# Patient Record
Sex: MALE | Race: WHITE | NOT HISPANIC OR LATINO | Employment: OTHER | URBAN - METROPOLITAN AREA
[De-identification: names, ages, dates, MRNs, and addresses within clinical notes are randomized per-mention and may not be internally consistent; named-entity substitution may affect disease eponyms.]

---

## 2018-05-18 ENCOUNTER — ESTABLISHED COMPREHENSIVE EXAM (OUTPATIENT)
Dept: URBAN - METROPOLITAN AREA CLINIC 36 | Facility: CLINIC | Age: 73
End: 2018-05-18

## 2018-05-18 DIAGNOSIS — H25.813: ICD-10-CM

## 2018-05-18 DIAGNOSIS — H04.123: ICD-10-CM

## 2018-05-18 DIAGNOSIS — H40.013: ICD-10-CM

## 2018-05-18 PROCEDURE — 92133 CPTRZD OPH DX IMG PST SGM ON: CPT

## 2018-05-18 PROCEDURE — 1036F TOBACCO NON-USER: CPT

## 2018-05-18 PROCEDURE — G8427 DOCREV CUR MEDS BY ELIG CLIN: HCPCS

## 2018-05-18 PROCEDURE — G8756 NO BP MEASURE DOC: HCPCS

## 2018-05-18 PROCEDURE — 92014 COMPRE OPH EXAM EST PT 1/>: CPT

## 2018-05-18 ASSESSMENT — VISUAL ACUITY
OD_SC: 20/25
OS_CC: J1
OS_SC: 20/40+2
OS_SC: J8
OD_CC: J2
OD_SC: J10
OS_PH: 20/20

## 2018-05-18 ASSESSMENT — TONOMETRY
OS_IOP_MMHG: 12
OD_IOP_MMHG: 11

## 2018-10-02 NOTE — PATIENT DISCUSSION
pt says vascular tortuosity has been noted up [facility] in past and sounds like she had an FA in the past (maybe 5-10 years ago).   This FA did not reveal any issues.

## 2018-11-16 ENCOUNTER — IOP CHECK (OUTPATIENT)
Dept: URBAN - METROPOLITAN AREA CLINIC 36 | Facility: CLINIC | Age: 73
End: 2018-11-16

## 2018-11-16 DIAGNOSIS — H40.013: ICD-10-CM

## 2018-11-16 PROCEDURE — 92083 EXTENDED VISUAL FIELD XM: CPT

## 2018-11-16 PROCEDURE — 92012 INTRM OPH EXAM EST PATIENT: CPT

## 2018-11-16 PROCEDURE — 1036F TOBACCO NON-USER: CPT

## 2018-11-16 PROCEDURE — G8756 NO BP MEASURE DOC: HCPCS

## 2018-11-16 PROCEDURE — G9903 PT SCRN TBCO ID AS NON USER: HCPCS

## 2018-11-16 PROCEDURE — G8428 CUR MEDS NOT DOCUMENT: HCPCS

## 2018-11-16 ASSESSMENT — VISUAL ACUITY
OD_SC: 20/25-1
OS_SC: 20/100
OS_PH: 20/30

## 2018-11-16 ASSESSMENT — TONOMETRY
OS_IOP_MMHG: 13
OD_IOP_MMHG: 12

## 2019-05-17 ENCOUNTER — ESTABLISHED COMPREHENSIVE EXAM (OUTPATIENT)
Dept: URBAN - METROPOLITAN AREA CLINIC 36 | Facility: CLINIC | Age: 74
End: 2019-05-17

## 2019-05-17 DIAGNOSIS — H40.013: ICD-10-CM

## 2019-05-17 DIAGNOSIS — H25.813: ICD-10-CM

## 2019-05-17 DIAGNOSIS — H04.123: ICD-10-CM

## 2019-05-17 DIAGNOSIS — I10: ICD-10-CM

## 2019-05-17 PROCEDURE — 92226 OPHTHALMOSCOPY (SUB): CPT

## 2019-05-17 PROCEDURE — 92014 COMPRE OPH EXAM EST PT 1/>: CPT

## 2019-05-17 PROCEDURE — 92250 FUNDUS PHOTOGRAPHY W/I&R: CPT

## 2019-05-17 ASSESSMENT — TONOMETRY
OD_IOP_MMHG: 12
OS_IOP_MMHG: 12

## 2019-05-17 ASSESSMENT — VISUAL ACUITY
OS_CC: J1
OD_SC: 20/25+2
OD_CC: J2
OS_BAT: 20/80
OS_SC: 20/70-1
OS_SC: J2
OS_PH: 20/25-1
OD_BAT: 20/60
OD_SC: J5

## 2019-07-24 ENCOUNTER — EST. PATIENT EMERGENCY (OUTPATIENT)
Dept: URBAN - METROPOLITAN AREA CLINIC 36 | Facility: CLINIC | Age: 74
End: 2019-07-24

## 2019-07-24 DIAGNOSIS — H35.371: ICD-10-CM

## 2019-07-24 DIAGNOSIS — H43.811: ICD-10-CM

## 2019-07-24 PROCEDURE — 92012 INTRM OPH EXAM EST PATIENT: CPT

## 2019-07-24 ASSESSMENT — VISUAL ACUITY
OS_PH: 20/30+2
OD_SC: 20/25
OS_SC: 20/70-1

## 2019-07-24 ASSESSMENT — TONOMETRY
OS_IOP_MMHG: 13
OD_IOP_MMHG: 13

## 2019-10-15 NOTE — PATIENT DISCUSSION
pt says vascular tortuosity has been noted up [address] in past and sounds like she had an FA in the past (maybe 5-10 years ago).   This FA did not reveal any issues.

## 2019-10-22 ENCOUNTER — EST. PATIENT EMERGENCY (OUTPATIENT)
Dept: URBAN - METROPOLITAN AREA CLINIC 36 | Facility: CLINIC | Age: 74
End: 2019-10-22

## 2019-10-22 DIAGNOSIS — H25.811: ICD-10-CM

## 2019-10-22 DIAGNOSIS — H04.123: ICD-10-CM

## 2019-10-22 DIAGNOSIS — H25.812: ICD-10-CM

## 2019-10-22 PROCEDURE — 92012 INTRM OPH EXAM EST PATIENT: CPT

## 2019-10-22 ASSESSMENT — VISUAL ACUITY
OD_SC: 20/25-2
OS_PH: 20/50
OS_SC: 20/70

## 2019-10-22 ASSESSMENT — TONOMETRY
OD_IOP_MMHG: 12
OS_IOP_MMHG: 13

## 2019-10-25 ENCOUNTER — CATARACT EVALUATION (OUTPATIENT)
Dept: URBAN - METROPOLITAN AREA CLINIC 36 | Facility: CLINIC | Age: 74
End: 2019-10-25

## 2019-10-25 DIAGNOSIS — H43.811: ICD-10-CM

## 2019-10-25 DIAGNOSIS — H35.371: ICD-10-CM

## 2019-10-25 DIAGNOSIS — H04.123: ICD-10-CM

## 2019-10-25 DIAGNOSIS — H40.013: ICD-10-CM

## 2019-10-25 DIAGNOSIS — H25.813: ICD-10-CM

## 2019-10-25 PROCEDURE — 92014 COMPRE OPH EXAM EST PT 1/>: CPT

## 2019-10-25 PROCEDURE — 92136TC INTERFEROMETRY - TECHNICAL COMPONENT

## 2019-10-25 PROCEDURE — 92134 CPTRZ OPH DX IMG PST SGM RTA: CPT

## 2019-10-25 ASSESSMENT — VISUAL ACUITY
OD_CC: J3
OS_PH: 20/25-1
OS_CC: J1
OD_SC: J8
OS_BAT: 20/80
OD_BAT: 20/60
OS_SC: J3
OD_SC: 20/20-1
OS_SC: 20/70-1

## 2019-10-25 ASSESSMENT — TONOMETRY
OS_IOP_MMHG: 12
OD_IOP_MMHG: 13

## 2020-05-22 ENCOUNTER — ESTABLISHED COMPREHENSIVE EXAM (OUTPATIENT)
Dept: URBAN - METROPOLITAN AREA CLINIC 36 | Facility: CLINIC | Age: 75
End: 2020-05-22

## 2020-05-22 DIAGNOSIS — H40.013: ICD-10-CM

## 2020-05-22 PROCEDURE — 92014 COMPRE OPH EXAM EST PT 1/>: CPT

## 2020-05-22 PROCEDURE — 92133 CPTRZD OPH DX IMG PST SGM ON: CPT

## 2020-05-22 ASSESSMENT — VISUAL ACUITY
OS_PH: 20/30
OS_CC: J1
OS_SC: 20/70-2
OS_SC: J2
OD_CC: J2
OD_SC: 20/25-2
OD_SC: J10
OD_BAT: 20/60
OS_BAT: 20/80

## 2020-05-22 ASSESSMENT — TONOMETRY
OD_IOP_MMHG: 12
OS_IOP_MMHG: 13

## 2020-10-22 NOTE — PATIENT DISCUSSION
pt says vascular tortuosity has been noted up [facility] in past and sounds like she had an FA in the past (maybe 5-10 years ago).   This FA did not reveal any issues.

## 2021-02-10 NOTE — PATIENT DISCUSSION
CATARACTS, OU- NOT VISUALLY SIGNIFICANT. DISC OPT OF GLS/HLAF-JB-EZFRIC-VS-REFRACTIVE SX TO REDUCE DEPENDENCY ON GLS.

## 2021-05-27 ENCOUNTER — ESTABLISHED COMPREHENSIVE EXAM (OUTPATIENT)
Dept: URBAN - METROPOLITAN AREA CLINIC 36 | Facility: CLINIC | Age: 76
End: 2021-05-27

## 2021-05-27 DIAGNOSIS — H04.123: ICD-10-CM

## 2021-05-27 DIAGNOSIS — H43.811: ICD-10-CM

## 2021-05-27 DIAGNOSIS — H35.371: ICD-10-CM

## 2021-05-27 DIAGNOSIS — H40.013: ICD-10-CM

## 2021-05-27 DIAGNOSIS — H25.812: ICD-10-CM

## 2021-05-27 DIAGNOSIS — H25.811: ICD-10-CM

## 2021-05-27 PROCEDURE — 92014 COMPRE OPH EXAM EST PT 1/>: CPT

## 2021-05-27 PROCEDURE — 92015 DETERMINE REFRACTIVE STATE: CPT

## 2021-05-27 PROCEDURE — 92083 EXTENDED VISUAL FIELD XM: CPT

## 2021-05-27 ASSESSMENT — VISUAL ACUITY
OS_PH: 20/40
OD_SC: J10+2
OS_SC: 20/100+1
OD_SC: 20/25
OS_SC: J8

## 2021-05-27 ASSESSMENT — TONOMETRY
OD_IOP_MMHG: 12
OS_IOP_MMHG: 12

## 2021-06-09 ENCOUNTER — REFRACTION ONLY (OUTPATIENT)
Dept: URBAN - METROPOLITAN AREA CLINIC 36 | Facility: CLINIC | Age: 76
End: 2021-06-09

## 2021-06-09 DIAGNOSIS — H52.7: ICD-10-CM

## 2021-06-09 PROCEDURE — 92015GRNC REFRACTION GLASSES RECHECK - NO CHARGE

## 2021-06-09 ASSESSMENT — VISUAL ACUITY
OD_CC: 20/25-1
OS_CC: 20/50

## 2021-10-22 NOTE — PATIENT DISCUSSION
pt says vascular tortuosity has been noted up [facility] in past and sounds like she had an FA in the past (maybe 5-10 years ago).   This FA did not reveal any issues.

## 2022-04-28 ENCOUNTER — EMERGENCY VISIT (OUTPATIENT)
Dept: URBAN - METROPOLITAN AREA CLINIC 36 | Facility: CLINIC | Age: 77
End: 2022-04-28

## 2022-04-28 DIAGNOSIS — H11.32: ICD-10-CM

## 2022-04-28 DIAGNOSIS — H57.12: ICD-10-CM

## 2022-04-28 PROCEDURE — 92012 INTRM OPH EXAM EST PATIENT: CPT

## 2022-04-28 ASSESSMENT — TONOMETRY
OD_IOP_MMHG: 12
OS_IOP_MMHG: 13

## 2022-04-28 ASSESSMENT — VISUAL ACUITY
OS_PH: 20/30-2
OD_SC: 20/30-2
OS_SC: 20/60-1

## 2022-05-26 ENCOUNTER — COMPREHENSIVE EXAM (OUTPATIENT)
Dept: URBAN - METROPOLITAN AREA CLINIC 36 | Facility: CLINIC | Age: 77
End: 2022-05-26

## 2022-05-26 DIAGNOSIS — H35.371: ICD-10-CM

## 2022-05-26 DIAGNOSIS — H43.811: ICD-10-CM

## 2022-05-26 DIAGNOSIS — I10: ICD-10-CM

## 2022-05-26 DIAGNOSIS — H40.013: ICD-10-CM

## 2022-05-26 DIAGNOSIS — H25.813: ICD-10-CM

## 2022-05-26 DIAGNOSIS — H04.123: ICD-10-CM

## 2022-05-26 DIAGNOSIS — H57.12: ICD-10-CM

## 2022-05-26 DIAGNOSIS — H52.7: ICD-10-CM

## 2022-05-26 PROCEDURE — 92015 DETERMINE REFRACTIVE STATE: CPT

## 2022-05-26 PROCEDURE — 92133 CPTRZD OPH DX IMG PST SGM ON: CPT

## 2022-05-26 PROCEDURE — 92014 COMPRE OPH EXAM EST PT 1/>: CPT

## 2022-05-26 ASSESSMENT — VISUAL ACUITY
OS_SC: 20/70-1
OS_PH: 20/25-1
OU_SC: 20/20-1
OD_SC: J8-1
OD_SC: 20/25
OS_SC: J3
OU_SC: J2

## 2022-05-26 ASSESSMENT — TONOMETRY
OS_IOP_MMHG: 13
OD_IOP_MMHG: 13

## 2022-09-20 ENCOUNTER — COMPREHENSIVE EXAM (OUTPATIENT)
Dept: URBAN - METROPOLITAN AREA CLINIC 36 | Facility: CLINIC | Age: 77
End: 2022-09-20

## 2022-09-20 DIAGNOSIS — I10: ICD-10-CM

## 2022-09-20 DIAGNOSIS — H57.12: ICD-10-CM

## 2022-09-20 DIAGNOSIS — H35.363: ICD-10-CM

## 2022-09-20 DIAGNOSIS — H43.811: ICD-10-CM

## 2022-09-20 DIAGNOSIS — H52.7: ICD-10-CM

## 2022-09-20 DIAGNOSIS — H35.371: ICD-10-CM

## 2022-09-20 DIAGNOSIS — H40.013: ICD-10-CM

## 2022-09-20 DIAGNOSIS — H25.813: ICD-10-CM

## 2022-09-20 DIAGNOSIS — H04.123: ICD-10-CM

## 2022-09-20 PROCEDURE — 92014 COMPRE OPH EXAM EST PT 1/>: CPT

## 2022-09-20 PROCEDURE — 92015 DETERMINE REFRACTIVE STATE: CPT

## 2022-09-20 ASSESSMENT — VISUAL ACUITY
OD_SC: J6
OS_SC: 20/100-1
OD_SC: 20/25
OS_SC: J2
OS_PH: 20/25-1

## 2022-09-20 ASSESSMENT — TONOMETRY
OD_IOP_MMHG: 19
OS_IOP_MMHG: 19

## 2023-04-20 NOTE — PATIENT DISCUSSION
Glasses Rx given. Gen: No acute distress, non toxic  HEENT: Mucous membranes moist, pink conjunctivae, EOMI  CV: RRR, nl s1/s2.  Resp: b/l expiratory wheeze. no resp distress  GI: Abdomen soft, NT, ND. No rebound, no guarding  : No CVAT  Neuro: A&O x 3, moving all 4 extremities  MSK: No spine or joint tenderness to palpation. no swelling b/l LE  Skin: No rashes. intact and perfused.

## 2023-10-04 ENCOUNTER — HOSPITAL ENCOUNTER (EMERGENCY)
Facility: HOSPITAL | Age: 78
Discharge: HOME | End: 2023-10-04
Attending: EMERGENCY MEDICINE
Payer: MEDICARE

## 2023-10-04 ENCOUNTER — APPOINTMENT (OUTPATIENT)
Facility: HOSPITAL | Age: 78
End: 2023-10-04
Payer: MEDICARE

## 2023-10-04 VITALS
DIASTOLIC BLOOD PRESSURE: 74 MMHG | TEMPERATURE: 98.2 F | SYSTOLIC BLOOD PRESSURE: 181 MMHG | BODY MASS INDEX: 30.51 KG/M2 | OXYGEN SATURATION: 97 % | HEART RATE: 63 BPM | RESPIRATION RATE: 18 BRPM | WEIGHT: 206 LBS | HEIGHT: 69 IN
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
## 2023-10-04 LAB
ANION GAP SERPL CALC-SCNC: 5 MMOL/L (ref 5–15)
BASOPHILS # BLD: 0 K/UL (ref 0–0.1)
BASOPHILS NFR BLD: 0 % (ref 0–1)
BUN SERPL-MCNC: 19 MG/DL (ref 6–20)
BUN/CREAT SERPL: 20 (ref 12–20)
CALCIUM SERPL-MCNC: 8.8 MG/DL (ref 8.5–10.1)
CHLORIDE SERPL-SCNC: 108 MMOL/L (ref 97–108)
CO2 SERPL-SCNC: 27 MMOL/L (ref 21–32)
CREAT SERPL-MCNC: 0.96 MG/DL (ref 0.7–1.3)
DIFFERENTIAL METHOD BLD: ABNORMAL
EKG ATRIAL RATE: 66 BPM
EKG DIAGNOSIS: NORMAL
EKG P AXIS: 30 DEGREES
EKG P-R INTERVAL: 178 MS
EKG Q-T INTERVAL: 408 MS
EKG QRS DURATION: 108 MS
EKG QTC CALCULATION (BAZETT): 427 MS
EKG R AXIS: 27 DEGREES
EKG T AXIS: 68 DEGREES
EKG VENTRICULAR RATE: 66 BPM
EOSINOPHIL # BLD: 0.2 K/UL (ref 0–0.4)
EOSINOPHIL NFR BLD: 3 % (ref 0–7)
ERYTHROCYTE [DISTWIDTH] IN BLOOD BY AUTOMATED COUNT: 12.7 % (ref 11.5–14.5)
GLUCOSE SERPL-MCNC: 99 MG/DL (ref 65–100)
HCT VFR BLD AUTO: 41.1 % (ref 36.6–50.3)
HGB BLD-MCNC: 13.6 G/DL (ref 12.1–17)
IMM GRANULOCYTES # BLD AUTO: 0 K/UL (ref 0–0.04)
IMM GRANULOCYTES NFR BLD AUTO: 0 % (ref 0–0.5)
INR PPP: 1.2 (ref 0.9–1.1)
LYMPHOCYTES # BLD: 1.9 K/UL (ref 0.8–3.5)
LYMPHOCYTES NFR BLD: 33 % (ref 12–49)
MCH RBC QN AUTO: 29.2 PG (ref 26–34)
MCHC RBC AUTO-ENTMCNC: 33.1 G/DL (ref 30–36.5)
MCV RBC AUTO: 88.4 FL (ref 80–99)
MONOCYTES # BLD: 0.8 K/UL (ref 0–1)
MONOCYTES NFR BLD: 14 % (ref 5–13)
NEUTS SEG # BLD: 3 K/UL (ref 1.8–8)
NEUTS SEG NFR BLD: 50 % (ref 32–75)
NRBC # BLD: 0 K/UL (ref 0–0.01)
NRBC BLD-RTO: 0 PER 100 WBC
PLATELET # BLD AUTO: 232 K/UL (ref 150–400)
PMV BLD AUTO: 9.9 FL (ref 8.9–12.9)
POTASSIUM SERPL-SCNC: 4.2 MMOL/L (ref 3.5–5.1)
PROTHROMBIN TIME: 12.4 SEC (ref 9–11.1)
RBC # BLD AUTO: 4.65 M/UL (ref 4.1–5.7)
SODIUM SERPL-SCNC: 140 MMOL/L (ref 136–145)
WBC # BLD AUTO: 5.9 K/UL (ref 4.1–11.1)

## 2023-10-04 PROCEDURE — 99284 EMERGENCY DEPT VISIT MOD MDM: CPT

## 2023-10-04 PROCEDURE — 70450 CT HEAD/BRAIN W/O DYE: CPT

## 2023-10-04 PROCEDURE — 80048 BASIC METABOLIC PNL TOTAL CA: CPT

## 2023-10-04 PROCEDURE — 72125 CT NECK SPINE W/O DYE: CPT

## 2023-10-04 PROCEDURE — 85025 COMPLETE CBC W/AUTO DIFF WBC: CPT

## 2023-10-04 PROCEDURE — 36415 COLL VENOUS BLD VENIPUNCTURE: CPT

## 2023-10-04 PROCEDURE — 6360000002 HC RX W HCPCS

## 2023-10-04 PROCEDURE — 85610 PROTHROMBIN TIME: CPT

## 2023-10-04 PROCEDURE — 90715 TDAP VACCINE 7 YRS/> IM: CPT

## 2023-10-04 PROCEDURE — 90471 IMMUNIZATION ADMIN: CPT

## 2023-10-04 PROCEDURE — 6370000000 HC RX 637 (ALT 250 FOR IP)

## 2023-10-04 RX ORDER — ACETAMINOPHEN 500 MG
1000 TABLET ORAL
Status: COMPLETED | OUTPATIENT
Start: 2023-10-04 | End: 2023-10-04

## 2023-10-04 RX ADMIN — ACETAMINOPHEN 1000 MG: 500 TABLET ORAL at 20:44

## 2023-10-04 RX ADMIN — TETANUS TOXOID, REDUCED DIPHTHERIA TOXOID AND ACELLULAR PERTUSSIS VACCINE, ADSORBED 0.5 ML: 5; 2.5; 8; 8; 2.5 SUSPENSION INTRAMUSCULAR at 22:10

## 2023-10-04 ASSESSMENT — PAIN - FUNCTIONAL ASSESSMENT: PAIN_FUNCTIONAL_ASSESSMENT: 0-10

## 2023-10-04 ASSESSMENT — PAIN DESCRIPTION - LOCATION: LOCATION: HEAD

## 2023-10-04 ASSESSMENT — PAIN SCALES - GENERAL: PAINLEVEL_OUTOF10: 2

## 2023-10-05 NOTE — ED NOTES
DC papers reviewed and in hand, pt verbalized understanding. Patient ambulatory upon discharge, no acute distress noted.        Eliel Becerra RN  10/04/23 [id number]

## 2023-10-05 NOTE — DISCHARGE INSTRUCTIONS
You were seen in the emergency department after a fall. We performed a thorough evaluation including labs and CT of your head and neck. We did not find any life-threatening injuries or reasons to be admitted to the hospital.  It does not appear that you have a head bleed or concussion. It does not appear that you have an injury to your neck. If anything changes or gets worse or if you have any new or concerning symptoms please return to the emergency department for reevaluation. Specifically you should return if you develop a worsening headache, dizziness, feel like you might pass out, chest pain or shortness of breath, confusion, weakness, numbness or tingling in your arms or legs, or any other acute concerns. Please follow-up with your PCP soon as possible for management of your blood pressure as this was high tonight. You have several abrasions, or scrapes. He should keep these clean and dry and replace your bandages at least every day. You do not have to wear bandages if you do not like. Be sure to use sunscreen and avoid direct sunlight on these wounds for the next 6 months. Return to the ED if you develop bleeding that does not stop, signs of infection such as redness or drainage, or fevers or chills.

## 2023-10-05 NOTE — ED PROVIDER NOTES
[facility] EMERGENCY DEPT  EMERGENCY DEPARTMENT ENCOUNTER       Pt Name: Hu Bruno  MRN: [id number]  [address] [facility] 1945  Date of evaluation: 10/4/2023  Provider: Pietro Garzon MD   PCP: No primary care provider on file. Note Started: 8:00 PM EDT 10/4/23     CHIEF COMPLAINT       Chief Complaint   Patient presents with    Fall     Patient tripped and fell, laceration to top of head. No LOC. Patient is on blood thinners. HISTORY OF PRESENT ILLNESS: 1 or more elements      History From: Patient, History limited by: none     Hu Bruno is a 66 y.o. male with history of polymyalgia rheumatica, aortic regurgitation, DVT 3 years ago, who presents to the ED after ground-level fall. He is taking Xarelto. He tripped over a curb approximately 1 hour prior to admission and did hit his head. No LOC. No vomiting or seizure-like activity. Denies headache. Does endorse some superficial pain on his forehead from a laceration. He also skinned his right knee. Otherwise denies additional injuries. Denies neck pain, weakness, numbness or tingling. No abdominal pain, chest pain, or shortness of breath. Denied presyncope prior to his fall, specifically denied dizziness, lightheadedness, blurry vision, tunnel vision, diaphoresis, chest pain or shortness of breath prior to his fall. He is very fit, exercises 3 days a week for greater than 1 hour. Please See MDM for Additional Details of the HPI/PMH  Nursing Notes were all reviewed and agreed with or any disagreements were addressed in the HPI. REVIEW OF SYSTEMS        Positives and Pertinent negatives as per HPI. PAST HISTORY     Past Medical History:  No past medical history on file. Polymyalgia rheumatica  Aortic regurgitation  DVT (status post thrombectomy 3 years ago)    Past Surgical History:  No past surgical history on file. DVT thrombectomy    Family History:  No family history on file.   Coronary artery disease    Social History:   Non-smoker,

## 2024-02-13 ENCOUNTER — COMPREHENSIVE EXAM (OUTPATIENT)
Dept: URBAN - METROPOLITAN AREA CLINIC 36 | Facility: CLINIC | Age: 79
End: 2024-02-13

## 2024-02-13 DIAGNOSIS — I10: ICD-10-CM

## 2024-02-13 DIAGNOSIS — H43.811: ICD-10-CM

## 2024-02-13 DIAGNOSIS — H52.7: ICD-10-CM

## 2024-02-13 DIAGNOSIS — H25.813: ICD-10-CM

## 2024-02-13 DIAGNOSIS — H35.363: ICD-10-CM

## 2024-02-13 DIAGNOSIS — H04.123: ICD-10-CM

## 2024-02-13 DIAGNOSIS — H40.013: ICD-10-CM

## 2024-02-13 DIAGNOSIS — H35.371: ICD-10-CM

## 2024-02-13 PROCEDURE — 92014 COMPRE OPH EXAM EST PT 1/>: CPT

## 2024-02-13 PROCEDURE — 92134 CPTRZ OPH DX IMG PST SGM RTA: CPT

## 2024-02-13 PROCEDURE — 92015 DETERMINE REFRACTIVE STATE: CPT

## 2024-02-13 ASSESSMENT — VISUAL ACUITY
OS_SC: J2
OD_SC: J5
OS_PH: 20/30
OD_SC: 20/20-2
OS_SC: 20/100

## 2024-02-13 ASSESSMENT — TONOMETRY
OS_IOP_MMHG: 17
OD_IOP_MMHG: 17

## 2024-03-08 ENCOUNTER — CONSULTATION/EVALUATION (OUTPATIENT)
Dept: URBAN - METROPOLITAN AREA CLINIC 36 | Facility: CLINIC | Age: 79
End: 2024-03-08

## 2024-03-08 DIAGNOSIS — H25.812: ICD-10-CM

## 2024-03-08 DIAGNOSIS — H40.013: ICD-10-CM

## 2024-03-08 DIAGNOSIS — H04.123: ICD-10-CM

## 2024-03-08 DIAGNOSIS — H25.813: ICD-10-CM

## 2024-03-08 DIAGNOSIS — H35.371: ICD-10-CM

## 2024-03-08 DIAGNOSIS — H35.363: ICD-10-CM

## 2024-03-08 DIAGNOSIS — H43.811: ICD-10-CM

## 2024-03-08 PROCEDURE — 92136 OPHTHALMIC BIOMETRY: CPT

## 2024-03-08 PROCEDURE — 92025-1 CORNEAL TOPOGRAPHY, INS

## 2024-03-08 PROCEDURE — 92014 COMPRE OPH EXAM EST PT 1/>: CPT

## 2024-03-08 PROCEDURE — 92133 CPTRZD OPH DX IMG PST SGM ON: CPT

## 2024-03-08 RX ORDER — PREDNISOLONE ACETATE 10 MG/ML: 1 SUSPENSION/ DROPS OPHTHALMIC

## 2024-03-08 RX ORDER — NEPAFENAC 3 MG/ML: 1 SUSPENSION/ DROPS OPHTHALMIC ONCE A DAY

## 2024-03-08 RX ORDER — MOXIFLOXACIN OPHTHALMIC 5 MG/ML: 1 SOLUTION/ DROPS OPHTHALMIC

## 2024-03-08 ASSESSMENT — VISUAL ACUITY
OD_BAT: 20/60 WITH MR
OD_SC: J5
OD_SC: 20/30-2
OS_SC: J2
OS_SC: 20/80-1
OD_RAM: 20/20
OS_PH: 20/30
OS_BAT: 20/80 WITH MR
OS_AM: 20/20-2

## 2024-03-08 ASSESSMENT — TONOMETRY
OS_IOP_MMHG: 17
OD_IOP_MMHG: 15

## 2024-03-25 ENCOUNTER — PRE-OP/H&P (OUTPATIENT)
Facility: LOCATION | Age: 79
End: 2024-03-25

## 2024-03-25 ENCOUNTER — SURGERY/PROCEDURE (OUTPATIENT)
Facility: LOCATION | Age: 79
End: 2024-03-25

## 2024-03-25 DIAGNOSIS — H25.813: ICD-10-CM

## 2024-03-25 DIAGNOSIS — H04.123: ICD-10-CM

## 2024-03-25 DIAGNOSIS — H40.013: ICD-10-CM

## 2024-03-25 DIAGNOSIS — H25.812: ICD-10-CM

## 2024-03-25 DIAGNOSIS — H43.811: ICD-10-CM

## 2024-03-25 DIAGNOSIS — H35.371: ICD-10-CM

## 2024-03-25 DIAGNOSIS — H35.363: ICD-10-CM

## 2024-03-25 PROCEDURE — 99211HP PRE-OP

## 2024-03-25 PROCEDURE — 66984CV REMOVE CATARACT, INSERT LENS, CUSTOM VISION

## 2024-03-25 PROCEDURE — 99199PCV PROF CUSTOM VISION PACKAGE

## 2024-03-25 PROCEDURE — 66999LNSR LENSAR LASER FOR CAT SX

## 2024-03-25 PROCEDURE — 65772LRI LRI DURING CAT SX

## 2024-03-26 ENCOUNTER — POST-OP (OUTPATIENT)
Dept: URBAN - METROPOLITAN AREA CLINIC 36 | Facility: CLINIC | Age: 79
End: 2024-03-26

## 2024-03-26 DIAGNOSIS — Z96.1: ICD-10-CM

## 2024-03-26 PROCEDURE — 99024 POSTOP FOLLOW-UP VISIT: CPT

## 2024-03-26 ASSESSMENT — VISUAL ACUITY
OD_SC: 20/30
OS_PH: 20/25
OS_SC: 20/40+2
OD_SC: J12
OS_SC: J10

## 2024-03-26 ASSESSMENT — TONOMETRY
OS_IOP_MMHG: 37
OD_IOP_MMHG: 14

## 2024-03-29 ENCOUNTER — POST OP/EVAL OF SECOND EYE (OUTPATIENT)
Dept: URBAN - METROPOLITAN AREA CLINIC 36 | Facility: CLINIC | Age: 79
End: 2024-03-29

## 2024-03-29 DIAGNOSIS — H40.013: ICD-10-CM

## 2024-03-29 DIAGNOSIS — H25.811: ICD-10-CM

## 2024-03-29 DIAGNOSIS — H43.811: ICD-10-CM

## 2024-03-29 DIAGNOSIS — Z96.1: ICD-10-CM

## 2024-03-29 DIAGNOSIS — H35.371: ICD-10-CM

## 2024-03-29 DIAGNOSIS — H04.123: ICD-10-CM

## 2024-03-29 DIAGNOSIS — H35.363: ICD-10-CM

## 2024-03-29 PROCEDURE — 99024 POSTOP FOLLOW-UP VISIT: CPT

## 2024-03-29 PROCEDURE — 92012 INTRM OPH EXAM EST PATIENT: CPT

## 2024-03-29 ASSESSMENT — VISUAL ACUITY
OD_SC: J8
OD_SC: 20/20-1
OS_SC: J1
OS_SC: 20/40+1
OS_PH: 20/25-1

## 2024-03-29 ASSESSMENT — TONOMETRY
OS_IOP_MMHG: 16
OD_IOP_MMHG: 15

## 2024-04-01 ENCOUNTER — PRE-OP/H&P (OUTPATIENT)
Facility: LOCATION | Age: 79
End: 2024-04-01

## 2024-04-01 ENCOUNTER — SURGERY/PROCEDURE (OUTPATIENT)
Facility: LOCATION | Age: 79
End: 2024-04-01

## 2024-04-01 DIAGNOSIS — H25.811: ICD-10-CM

## 2024-04-01 DIAGNOSIS — H40.013: ICD-10-CM

## 2024-04-01 DIAGNOSIS — Z96.1: ICD-10-CM

## 2024-04-01 DIAGNOSIS — H35.363: ICD-10-CM

## 2024-04-01 DIAGNOSIS — H35.371: ICD-10-CM

## 2024-04-01 DIAGNOSIS — H43.811: ICD-10-CM

## 2024-04-01 DIAGNOSIS — H04.123: ICD-10-CM

## 2024-04-01 PROCEDURE — 99199PCV PROF CUSTOM VISION PACKAGE

## 2024-04-01 PROCEDURE — 65772LRI LRI DURING CAT SX

## 2024-04-01 PROCEDURE — 99211HP PRE-OP

## 2024-04-01 PROCEDURE — 66999LNSR LENSAR LASER FOR CAT SX

## 2024-04-01 PROCEDURE — 66984CV REMOVE CATARACT, INSERT LENS, CUSTOM VISION: Mod: 79,RT

## 2024-04-01 ASSESSMENT — VISUAL ACUITY
OD_SC: 20/30
OS_SC: 20/40

## 2024-04-02 ENCOUNTER — POST-OP (OUTPATIENT)
Dept: URBAN - METROPOLITAN AREA CLINIC 36 | Facility: CLINIC | Age: 79
End: 2024-04-02

## 2024-04-02 DIAGNOSIS — Z96.1: ICD-10-CM

## 2024-04-02 PROCEDURE — 99024 POSTOP FOLLOW-UP VISIT: CPT

## 2024-04-02 ASSESSMENT — VISUAL ACUITY
OS_SC: J3
OS_PH: 20/20
OD_SC: 20/25+2
OD_SC: J4
OS_SC: 20/30+2

## 2024-04-02 ASSESSMENT — TONOMETRY
OD_IOP_MMHG: 24
OS_IOP_MMHG: 20

## 2024-04-10 ENCOUNTER — POST-OP (OUTPATIENT)
Dept: URBAN - METROPOLITAN AREA CLINIC 36 | Facility: CLINIC | Age: 79
End: 2024-04-10

## 2024-04-10 DIAGNOSIS — Z96.1: ICD-10-CM

## 2024-04-10 PROCEDURE — 99024 POSTOP FOLLOW-UP VISIT: CPT

## 2024-04-10 ASSESSMENT — VISUAL ACUITY
OU_SC: 20/25
OS_SC: J1
OD_SC: 20/20-2
OD_SC: J8
OS_PH: 20/25
OS_SC: 20/60-1

## 2024-04-10 ASSESSMENT — TONOMETRY
OS_IOP_MMHG: 15
OD_IOP_MMHG: 15

## 2024-05-01 ENCOUNTER — POST-OP (OUTPATIENT)
Dept: URBAN - METROPOLITAN AREA CLINIC 36 | Facility: CLINIC | Age: 79
End: 2024-05-01

## 2024-05-01 DIAGNOSIS — Z96.1: ICD-10-CM

## 2024-05-01 PROCEDURE — 99024 POSTOP FOLLOW-UP VISIT: CPT

## 2024-05-01 ASSESSMENT — VISUAL ACUITY
OS_SC: J1
OD_SC: J10
OS_SC: 20/60-1
OU_SC: 20/25
OS_PH: 20/25-1
OD_SC: 20/20

## 2024-05-01 ASSESSMENT — TONOMETRY
OS_IOP_MMHG: 12
OD_IOP_MMHG: 12

## 2024-05-23 ENCOUNTER — FOLLOW UP (OUTPATIENT)
Dept: URBAN - METROPOLITAN AREA CLINIC 36 | Facility: CLINIC | Age: 79
End: 2024-05-23

## 2024-05-23 DIAGNOSIS — H04.123: ICD-10-CM

## 2024-05-23 PROCEDURE — 92012 INTRM OPH EXAM EST PATIENT: CPT | Mod: 24

## 2024-05-23 ASSESSMENT — TONOMETRY
OS_IOP_MMHG: 11
OD_IOP_MMHG: 11

## 2024-05-23 ASSESSMENT — VISUAL ACUITY
OD_SC: 20/30-1
OS_PH: 20/25
OS_SC: 20/50-1

## 2025-02-07 ENCOUNTER — COMPREHENSIVE EXAM (OUTPATIENT)
Age: 80
End: 2025-02-07

## 2025-02-07 DIAGNOSIS — I10: ICD-10-CM

## 2025-02-07 DIAGNOSIS — H35.371: ICD-10-CM

## 2025-02-07 DIAGNOSIS — Z96.1: ICD-10-CM

## 2025-02-07 DIAGNOSIS — H52.7: ICD-10-CM

## 2025-02-07 DIAGNOSIS — H26.491: ICD-10-CM

## 2025-02-07 DIAGNOSIS — H40.013: ICD-10-CM

## 2025-02-07 DIAGNOSIS — H35.363: ICD-10-CM

## 2025-02-07 DIAGNOSIS — H43.811: ICD-10-CM

## 2025-02-07 DIAGNOSIS — H04.123: ICD-10-CM

## 2025-02-07 PROCEDURE — 92015 DETERMINE REFRACTIVE STATE: CPT

## 2025-02-07 PROCEDURE — 92014 COMPRE OPH EXAM EST PT 1/>: CPT

## 2025-03-21 ENCOUNTER — CONSULTATION/EVALUATION (OUTPATIENT)
Age: 80
End: 2025-03-21

## 2025-03-21 DIAGNOSIS — H26.493: ICD-10-CM

## 2025-03-21 DIAGNOSIS — H40.013: ICD-10-CM

## 2025-03-21 DIAGNOSIS — Z96.1: ICD-10-CM

## 2025-03-21 DIAGNOSIS — H04.123: ICD-10-CM

## 2025-03-21 PROCEDURE — 92004 COMPRE OPH EXAM NEW PT 1/>: CPT

## 2025-03-21 PROCEDURE — 66821 AFTER CATARACT LASER SURGERY: CPT | Mod: 50

## 2025-04-01 ENCOUNTER — POST-OP (OUTPATIENT)
Age: 80
End: 2025-04-01

## 2025-04-01 DIAGNOSIS — Z98.890: ICD-10-CM

## 2025-04-01 PROCEDURE — 99024 POSTOP FOLLOW-UP VISIT: CPT
